# Patient Record
Sex: FEMALE | Race: WHITE | NOT HISPANIC OR LATINO | Employment: OTHER | URBAN - METROPOLITAN AREA
[De-identification: names, ages, dates, MRNs, and addresses within clinical notes are randomized per-mention and may not be internally consistent; named-entity substitution may affect disease eponyms.]

---

## 2021-04-19 VITALS — BODY MASS INDEX: 27.28 KG/M2 | HEIGHT: 68 IN | WEIGHT: 180 LBS

## 2021-04-19 RX ORDER — MULTIVITAMIN
1 TABLET ORAL DAILY
COMMUNITY

## 2021-04-19 RX ORDER — ALENDRONATE SODIUM 70 MG/1
70 TABLET ORAL
COMMUNITY
End: 2021-05-03

## 2021-04-19 NOTE — TELEPHONE ENCOUNTER
Why does your doctor want you to have this procedure? Hx  Colon cancer    Do you have kidney disease?  no  If yes, are you on dialysis :     Have you had diverticulitis within the past 2 months? no    Are you diabetic?  no  If yes, insulin dependent: If yes, provide diabetic instructions sheet     Do take iron supplements?  no  If yes, instruct patient to hold iron supplement for 7 days prior    Are you on a blood thinner? no   Was the blood thinner sheet complete and faxed to cardiologist no  Plavix (clopidogrel), Coumadin (warfarin), Lovenox (enoxaparin), Xarelto (rivaroxaban), Pradaxa(dabigatran), Eliquis(apixaban) Savaysa/Lixiana (edoxapan)    Do you have an automatic implantable cardiac defibrillator (AICD)/pacemaker ([facility])? no  Was AICD/pacemaker sheet completed and faxed to cardiologist? no    Are you on home oxygen? no  If yes, continuous or nocturnal:     Have you been treated for MRSA, VRE or any communicable diseases? no    Heart attack, stroke, or stent within 3 months? no  Schedule at Hospital if within 3-6 months   Use nitroglycerin for chest pain in the last 6 months? no    History of organ  transplant?  no   If yes, notify Endo      History of neck/throat/tongue surgery or cancer? no  IF yes, notify Endo      Any problems with anesthesia in the past? no     Was stool C diff ordered?  no Stool specimen needs to be completed prior to procedure    Do have any facial or body piercings?no     Do you have a latex allergy? no     Do have an allergy to metals? (Bravo study only) no     If pediatric patient, was consent faxed to pediatrician no     Patient rights reviewed yes    Miralax prep given and instructions emailed to patient

## 2021-05-03 ENCOUNTER — HOSPITAL ENCOUNTER (OUTPATIENT)
Dept: GASTROENTEROLOGY | Facility: AMBULATORY SURGERY CENTER | Age: 81
Discharge: HOME | End: 2021-05-03
Payer: MEDICARE

## 2021-05-03 ENCOUNTER — ANESTHESIA (OUTPATIENT)
Dept: GASTROENTEROLOGY | Facility: AMBULATORY SURGERY CENTER | Age: 81
End: 2021-05-03

## 2021-05-03 ENCOUNTER — ANESTHESIA EVENT (OUTPATIENT)
Dept: GASTROENTEROLOGY | Facility: AMBULATORY SURGERY CENTER | Age: 81
End: 2021-05-03

## 2021-05-03 VITALS
OXYGEN SATURATION: 100 % | BODY MASS INDEX: 28.13 KG/M2 | HEART RATE: 66 BPM | WEIGHT: 185 LBS | DIASTOLIC BLOOD PRESSURE: 66 MMHG | RESPIRATION RATE: 18 BRPM | SYSTOLIC BLOOD PRESSURE: 132 MMHG | TEMPERATURE: 97.7 F

## 2021-05-03 DIAGNOSIS — Z80.0 FAMILY HISTORY OF COLON CANCER: ICD-10-CM

## 2021-05-03 DIAGNOSIS — Z86.010 HISTORY OF COLON POLYPS: ICD-10-CM

## 2021-05-03 DIAGNOSIS — Z83.71 FAMILY HISTORY OF COLONIC POLYPS: ICD-10-CM

## 2021-05-03 DIAGNOSIS — Z85.038 PERSONAL HISTORY OF COLON CANCER: ICD-10-CM

## 2021-05-03 PROCEDURE — G0105 COLORECTAL SCRN; HI RISK IND: HCPCS | Performed by: INTERNAL MEDICINE

## 2021-05-03 RX ORDER — SODIUM CHLORIDE 9 MG/ML
50 INJECTION, SOLUTION INTRAVENOUS CONTINUOUS
Status: DISCONTINUED | OUTPATIENT
Start: 2021-05-03 | End: 2021-05-07 | Stop reason: HOSPADM

## 2021-05-03 RX ORDER — PROPOFOL 10 MG/ML
INJECTION, EMULSION INTRAVENOUS AS NEEDED
Status: DISCONTINUED | OUTPATIENT
Start: 2021-05-03 | End: 2021-05-03

## 2021-05-03 RX ADMIN — PROPOFOL 20 MG: 10 INJECTION, EMULSION INTRAVENOUS at 14:26

## 2021-05-03 RX ADMIN — PROPOFOL 20 MG: 10 INJECTION, EMULSION INTRAVENOUS at 14:23

## 2021-05-03 RX ADMIN — PROPOFOL 20 MG: 10 INJECTION, EMULSION INTRAVENOUS at 14:24

## 2021-05-03 RX ADMIN — PROPOFOL 10 MG: 10 INJECTION, EMULSION INTRAVENOUS at 14:29

## 2021-05-03 RX ADMIN — PROPOFOL 50 MG: 10 INJECTION, EMULSION INTRAVENOUS at 14:22

## 2021-05-03 RX ADMIN — PROPOFOL 20 MG: 10 INJECTION, EMULSION INTRAVENOUS at 14:28

## 2021-05-03 RX ADMIN — PROPOFOL 20 MG: 10 INJECTION, EMULSION INTRAVENOUS at 14:27

## 2021-05-03 RX ADMIN — SODIUM CHLORIDE 50 ML/HR: 9 INJECTION, SOLUTION INTRAVENOUS at 13:51

## 2021-05-03 RX ADMIN — PROPOFOL 20 MG: 10 INJECTION, EMULSION INTRAVENOUS at 14:25

## 2021-05-03 RX ADMIN — PROPOFOL 20 MG: 10 INJECTION, EMULSION INTRAVENOUS at 14:30

## 2021-05-03 NOTE — DISCHARGE INSTRUCTIONS
Colonoscopy   WHAT YOU NEED TO KNOW:   A colonoscopy is a procedure to examine the inside of your colon (intestine) with a scope  Polyps or tissue growths may have been removed during your colonoscopy  It is normal to feel bloated and to have some abdominal discomfort  You should be passing gas  If you have hemorrhoids or you had polyps removed, you may have a small amount of bleeding  DISCHARGE INSTRUCTIONS:   Seek care immediately if:    You have sudden, severe abdominal pain   You have problems swallowing   You have a large amount of black, sticky bowel movements or blood in your bowel movements   You have sudden trouble breathing   You feel weak, lightheaded, or faint or your heart beats faster than normal for you  Contact your healthcare provider if:    You have a fever and chills   You have nausea or are vomiting   Your abdomen is bloated or feels full and hard   You have abdominal pain   You have black, sticky bowel movements or blood in your bowel movements   You have not had a bowel movement for 3 days after your procedure   You have rash or hives   You have questions or concerns about your procedure  Activity:    Do not lift, strain, or run for 24 hours after your procedure   Rest after your procedure  You have been given medicine to relax you  Do not drive or make important decisions until the day after your procedure  Return to your normal activity as directed   Relieve gas and discomfort from bloating by lying on your right side with a heating pad on your abdomen  You may need to take short walks to help the gas move out  Eat small meals until bloating is relieved  Follow up with your healthcare provider as directed: Write down your questions so you remember to ask them during your visits  If you take a blood thinner, please review the specific instructions from your endoscopist about when you should resume it   These can be found in the Recommendation and Your Medication list sections of this After Visit Summary  Hemorrhoids   WHAT YOU NEED TO KNOW:   What are hemorrhoids? Hemorrhoids are swollen blood vessels inside your rectum (internal hemorrhoids) or on your anus (external hemorrhoids)  Sometimes a hemorrhoid may prolapse  This means it extends out of your anus  What increases my risk for hemorrhoids? · Pregnancy or obesity    · Straining or sitting for a long time during bowel movements    · Liver disease    · Weak muscles around the anus caused by older age, rectal surgery, or anal intercourse    · A lack of physical activity    · Chronic diarrhea or constipation    · A low-fiber diet    What are the signs and symptoms of hemorrhoids? · Pain or itching around your anus or inside your rectum    · Swelling or bumps around your anus    · Bright red blood in your bowel movement, on the toilet paper, or in the toilet bowl    · Tissue bulging out of your anus (prolapsed hemorrhoids)    · Incontinence (poor control over urine or bowel movements)    How are hemorrhoids diagnosed? Your healthcare provider will ask about your symptoms, the foods you eat, and your bowel movements  He or she will examine your anus for external hemorrhoids  You may need the following:  · A digital rectal exam  is a test to check for hemorrhoids  Your healthcare provider will put a gloved finger inside your anus to feel for the hemorrhoids  · An anoscopy  is a test that uses a scope (small tube with a light and camera on the end) to look at your hemorrhoids  How are hemorrhoids treated? Treatment will depend on your symptoms  You may need any of the following:  · Medicines  can help decrease pain and swelling, and soften your bowel movement  The medicine may be a pill, pad, cream, or ointment  · Procedures  may be used to shrink or remove your hemorrhoid  Examples include rubber-band ligation, sclerotherapy, and photocoagulation   These procedures may be done in your healthcare provider's office  Ask your healthcare provider for more information about these procedures  · Surgery  may be needed to shrink or remove your hemorrhoids  How can I manage my symptoms? · Apply ice on your anus for 15 to 20 minutes every hour or as directed  Use an ice pack, or put crushed ice in a plastic bag  Cover it with a towel before you apply it to your anus  Ice helps prevent tissue damage and decreases swelling and pain  · Take a sitz bath  Fill a bathtub with 4 to 6 inches of warm water  You may also use a sitz bath pan that fits inside a toilet bowl  Sit in the sitz bath for 15 minutes  Do this 3 times a day, and after each bowel movement  The warm water can help decrease pain and swelling  · Keep your anal area clean  Gently wash the area with warm water daily  Soap may irritate the area  After a bowel movement, wipe with moist towelettes or wet toilet paper  Dry toilet paper can irritate the area  How can I help prevent hemorrhoids? · Do not strain to have a bowel movement  Do not sit on the toilet too long  These actions can increase pressure on the tissues in your rectum and anus  · Drink plenty of liquids  Liquids can help prevent constipation  Ask how much liquid to drink each day and which liquids are best for you  · Eat a variety of high-fiber foods  Examples include fruits, vegetables, and whole grains  Ask your healthcare provider how much fiber you need each day  You may need to take a fiber supplement  · Exercise as directed  Exercise, such as walking, may make it easier to have a bowel movement  Ask your healthcare provider to help you create an exercise plan  · Do not have anal sex  Anal sex can weaken the skin around your rectum and anus  · Avoid heavy lifting  This can cause straining and increase your risk for another hemorrhoid  When should I seek immediate care?    · You have severe pain in your rectum or around your anus  · You have severe pain in your abdomen and you are vomiting  · You have bleeding from your anus that soaks through your underwear  When should I contact my healthcare provider? · You have frequent and painful bowel movements  · Your hemorrhoid looks or feels more swollen than usual      · You do not have a bowel movement for 2 days or more  · You see or feel tissue coming through your anus  · You have questions or concerns about your condition or care  CARE AGREEMENT:   You have the right to help plan your care  Learn about your health condition and how it may be treated  Discuss treatment options with your healthcare providers to decide what care you want to receive  You always have the right to refuse treatment  The above information is an  only  It is not intended as medical advice for individual conditions or treatments  Talk to your doctor, nurse or pharmacist before following any medical regimen to see if it is safe and effective for you  © Copyright [address] Information is for End User's use only and may not be sold, redistributed or otherwise used for commercial purposes   All illustrations and images included in CareNotes® are the copyrighted property of A D A M [employer] Inc  or [address] [place] [address]

## 2021-05-03 NOTE — ANESTHESIA PREPROCEDURE EVALUATION
Procedure:  COLONOSCOPY    Relevant Problems   ANESTHESIA (within normal limits)      CARDIO   (+) Atrial fibrillation (HCC) (Resolved)      PULMONARY   (+) Sleep apnea      Other   (+) Irregular heart beat        Physical Exam    Airway    Mallampati score: II  TM Distance: >3 FB  Neck ROM: full     Dental       Cardiovascular  Rhythm: regular, Rate: normal,     Pulmonary  Decreased breath sounds,     Other Findings        Anesthesia Plan  ASA Score- 2     Anesthesia Type- IV sedation with anesthesia with ASA Monitors  Additional Monitors:   Airway Plan:           Plan Factors-Exercise tolerance (METS): >4 METS  Chart reviewed  Patient summary reviewed  Patient is not a current smoker  Induction- intravenous  Postoperative Plan-     Informed Consent- Anesthetic plan and risks discussed with patient

## 2021-05-03 NOTE — H&P
History and Physical - [facility]  Carolina Morrell 68 y o  female MRN: [id number]    HPI: Carolina Morrell is a 68y o  year old female who presents for surveillance colonoscopy for personal history of colon cancer    REVIEW OF SYSTEMS: Per the HPI, and otherwise unremarkable      Historical Information   Past Medical History:   Diagnosis Date    Atrial fibrillation ([facility] [address] [facility]     Colon cancer [facility] [address] [facility]     Irregular heart beat     Sleep apnea     mild     Past Surgical History:   Procedure Laterality Date    APPENDECTOMY      AV NODE ABLATION      CARDIAC LOOP RECORDER      COLON SURGERY      DILATION AND EVACUATION      TONSILLECTOMY      TOTAL HIP ARTHROPLASTY Bilateral      Social History   Social History     Substance and Sexual Activity   Alcohol Use Yes    Frequency: 2-4 times a month     Social History     Substance and Sexual Activity   Drug Use Never     Social History     Tobacco Use   Smoking Status Former Smoker   Smokeless Tobacco Never Used     Family History   Problem Relation Age of Onset    Breast cancer additional onset Mother     Heart disease Father     Cancer Brother     Colon cancer Brother        Meds/Allergies       Current Outpatient Medications:     Calcium Carbonate-Vitamin D (CALCIUM-D PO)    co-enzyme Q-10 30 MG capsule    metoprolol tartrate (LOPRESSOR) 25 mg tablet    Multiple Vitamin (multivitamin) tablet    VITAMIN B COMPLEX-C PO    Current Facility-Administered Medications:     sodium chloride 0 9 % infusion, 50 mL/hr, Intravenous, Continuous, 50 mL/hr at 05/03/21 1351    No Known Allergies    Objective     /66   Pulse 70   Temp 97 7 °F (36 5 °C) (Temporal)   Resp 12   Wt 83 9 kg (185 lb)   SpO2 98%   BMI 28 13 kg/m²     PHYSICAL EXAM    Gen: NAD AAOx3  CV: S1S2 RRR no m/r/g  CHEST: Clear b/l no c/r/w  ABD: soft, +BS NT/ND  EXT: no edema    ASSESSMENT/PLAN:  This is a 68y o  year old female here for colonoscopy, and she is stable and optimized for her procedure

## 2022-05-17 ENCOUNTER — TELEPHONE (OUTPATIENT)
Dept: GASTROENTEROLOGY | Facility: CLINIC | Age: 82
End: 2022-05-17

## 2022-05-17 NOTE — TELEPHONE ENCOUNTER
Pt states sev'l yrs ago she had surg for colon CA; was constipated and Sat disimpacted; since has had some rectal blood on TP off & on; has no abd pain & did not request appt  CB# [phone] today if possible

## 2022-05-18 ENCOUNTER — OFFICE VISIT (OUTPATIENT)
Dept: GASTROENTEROLOGY | Facility: CLINIC | Age: 82
End: 2022-05-18
Payer: MEDICARE

## 2022-05-18 VITALS
SYSTOLIC BLOOD PRESSURE: 124 MMHG | WEIGHT: 187 LBS | BODY MASS INDEX: 28.34 KG/M2 | HEIGHT: 68 IN | HEART RATE: 75 BPM | DIASTOLIC BLOOD PRESSURE: 82 MMHG

## 2022-05-18 DIAGNOSIS — K59.00 CONSTIPATION, UNSPECIFIED CONSTIPATION TYPE: ICD-10-CM

## 2022-05-18 DIAGNOSIS — K62.5 RECTAL BLEEDING: Primary | ICD-10-CM

## 2022-05-18 DIAGNOSIS — Z85.038 PERSONAL HISTORY OF COLON CANCER: ICD-10-CM

## 2022-05-18 PROCEDURE — 99213 OFFICE O/P EST LOW 20 MIN: CPT | Performed by: INTERNAL MEDICINE

## 2022-05-18 PROCEDURE — 1123F ACP DISCUSS/DSCN MKR DOCD: CPT | Performed by: INTERNAL MEDICINE

## 2022-05-18 NOTE — LETTER
May 18, 2022     [place] [address]  [address] [facility] [place] [address]    Patient: Mariya Abdullahi   YOB: 1940   Date of Visit: 5/18/2022       Dear Dr Evelyn Parker: Thank you for referring Lorena Gilbert to me for evaluation  Below are my notes for this consultation  If you have questions, please do not hesitate to call me  I look forward to following your patient along with you  Sincerely,        Frances Hamilton MD        CC: No Recipients  Frances Hamilton MD  5/18/2022  2:14 PM  Sign when Signing Visit  [address] [facility] - Outpatient Follow-up Note  Mariya Abdullahi 80 y o  female MRN: [id number]  Encounter: [id number]    ASSESSMENT AND PLAN:      1  Rectal bleeding  2  Constipation, unspecified constipation type  Rectal bleeding consistent with hemorrhoidal bleed after a bout of constipation  colonoscopy 1 year ago did confirm internal hemorrhoids along with a healthy-appearing colorectal anastomosis and was otherwise a normal examination, so the risk of recurrent colonic neoplasia is very small  Discussed importance of fiber and fluid in diet  Given resolution of symptoms would not subject to repeat endoscopic evaluation unless symptoms persist   · Continue with healthy diet with plenty of fruits and vegetables  · Continue to drink plenty fluids  · Encouraged starting a fiber supplement such as psyllium start with 1-2 spoons or if taking capsules at least 2-3 g daily  · Call for follow-up if bleeding returns or any other symptoms  3  Personal history of colon cancer  · Up-to-date with surveillance colonoscopies  Recommend examination every 3 years    Next colonoscopy due May 2024      Followup Appointment:  If bleeding or any other lower GI symptoms return  ______________________________________________________________________    Chief Complaint   Patient presents with    Rectal Bleeding     Constipation, rectal bleeding       HPI:  Works as   Usually drinks a lot, but not last week  Got constipated  Had rectal bleeding  Had some blood on stool, though hard to tell as there was a lot  Since then normal stool, but blood on toilet paper  Usually has several stools daily  Now with normal bowel habits and no more bleeding  Discussed colonoscopy from last year  Historical Information   Past Medical History:   Diagnosis Date    [facility] [address] [facility]     Colon cancer [facility] [address] [facility]     Irregular heart beat     Sleep apnea     mild     Past Surgical History:   Procedure Laterality Date    APPENDECTOMY      AV NODE ABLATION      CARDIAC LOOP RECORDER      COLON SURGERY      COLONOSCOPY      May 2021:  Normal/negative surveillance colonoscopy, no masses or polyps  Healthy-appearing anastomosis noted at 10 centimeters  Internal hemorrhoids   DILATION AND EVACUATION      TONSILLECTOMY      TOTAL HIP ARTHROPLASTY Bilateral      Social History     Substance and Sexual Activity   Alcohol Use Yes     Social History     Substance and Sexual Activity   Drug Use Never     Social History     Tobacco Use   Smoking Status Former Smoker   Smokeless Tobacco Never Used     Family History   Problem Relation Age of Onset    Breast cancer additional onset Mother     Heart disease Father     Cancer Brother     Colon cancer Brother          Current Outpatient Medications:     Calcium Carbonate-Vitamin D (CALCIUM-D PO)    co-enzyme Q-10 30 MG capsule    metoprolol tartrate (LOPRESSOR) 25 mg tablet    Multiple Vitamin (multivitamin) tablet    VITAMIN B COMPLEX-C PO  No Known Allergies  Reviewed medications and allergies and updated as indicated    PHYSICAL EXAM:    Blood pressure 124/82, pulse 75, height 5' 8" (1 727 m), weight 84 8 kg (187 lb)  Body mass index is 28 43 kg/m²  General Appearance: NAD, cooperative, alert  Eyes: Anicteric, PERRLA, EOMI  ENT:  Normocephalic, atraumatic, normal mucosa  Neck:  Supple, symmetrical, trachea midline  Resp:  Clear to auscultation bilaterally; no rales, rhonchi or wheezing; respirations unlabored   CV:  S1 S2, Regular rate and rhythm; no murmur, rub, or gallop  GI:  Soft, non-tender, non-distended; normal bowel sounds; no masses, no organomegaly   Rectal:  No external lesions, small hemorrhoid visible with red spot  Digital exam notable only for palpable hemorrhoids, no mass and no blood  Musculoskeletal: No cyanosis, clubbing or edema  Normal ROM  Skin:  No jaundice, rashes, or lesions   Heme/Lymph: No palpable cervical lymphadenopathy  Psych: Normal affect, good eye contact  Neuro: No gross deficits, AAOx3    Lab Results:   No results found for: WBC, HGB, HCT, MCV, PLT  No results found for: NA, K, CL, CO2, ANIONGAP, BUN, CREATININE, GLUCOSE, GLUF, CALCIUM, CORRECTEDCA, AST, ALT, ALKPHOS, PROT, BILITOT, EGFR  No results found for: IRON, TIBC, FERRITIN  No results found for: LIPASE    Radiology Results:   No results found

## 2022-05-18 NOTE — PATIENT INSTRUCTIONS
Continue with healthy diet with plenty of fruits and vegetables  Continue to drink plenty fluids  Encouraged starting a fiber supplement such as psyllium start with 1-2 spoons or if taking capsules at least 2-3 g daily  Call for follow-up if bleeding returns or any other symptoms

## 2022-05-18 NOTE — PROGRESS NOTES
[id number] - Outpatient Follow-up Note  John Kennedy 80 y o  female MRN: [id number]  Encounter: [id number]    ASSESSMENT AND PLAN:      1  Rectal bleeding  2  Constipation, unspecified constipation type  Rectal bleeding consistent with hemorrhoidal bleed after a bout of constipation  colonoscopy 1 year ago did confirm internal hemorrhoids along with a healthy-appearing colorectal anastomosis and was otherwise a normal examination, so the risk of recurrent colonic neoplasia is very small  Discussed importance of fiber and fluid in diet  Given resolution of symptoms would not subject to repeat endoscopic evaluation unless symptoms persist   · Continue with healthy diet with plenty of fruits and vegetables  · Continue to drink plenty fluids  · Encouraged starting a fiber supplement such as psyllium start with 1-2 spoons or if taking capsules at least 2-3 g daily  · Call for follow-up if bleeding returns or any other symptoms  3  Personal history of colon cancer  · Up-to-date with surveillance colonoscopies  Recommend examination every 3 years  Next colonoscopy due May 2024      Followup Appointment:  If bleeding or any other lower GI symptoms return  ______________________________________________________________________    Chief Complaint   Patient presents with    Rectal Bleeding     Constipation, rectal bleeding       HPI:  Works as   Usually drinks a lot, but not last week  Got constipated  Had rectal bleeding  Had some blood on stool, though hard to tell as there was a lot  Since then normal stool, but blood on toilet paper  Usually has several stools daily  Now with normal bowel habits and no more bleeding  Discussed colonoscopy from last year       Historical Information   Past Medical History:   Diagnosis Date    [facility] [address]     Colon cancer (HCC)     Irregular heart beat     Sleep apnea     mild     Past Surgical History:   Procedure Laterality Date    APPENDECTOMY      AV NODE ABLATION      CARDIAC LOOP RECORDER      COLON SURGERY      COLONOSCOPY      May 2021:  Normal/negative surveillance colonoscopy, no masses or polyps  Healthy-appearing anastomosis noted at 10 centimeters  Internal hemorrhoids   DILATION AND EVACUATION      TONSILLECTOMY      TOTAL HIP ARTHROPLASTY Bilateral      Social History     Substance and Sexual Activity   Alcohol Use Yes     Social History     Substance and Sexual Activity   Drug Use Never     Social History     Tobacco Use   Smoking Status Former Smoker   Smokeless Tobacco Never Used     Family History   Problem Relation Age of Onset    Breast cancer additional onset Mother     Heart disease Father     Cancer Brother     Colon cancer Brother          Current Outpatient Medications:     Calcium Carbonate-Vitamin D (CALCIUM-D PO)    co-enzyme Q-10 30 MG capsule    metoprolol tartrate (LOPRESSOR) 25 mg tablet    Multiple Vitamin (multivitamin) tablet    VITAMIN B COMPLEX-C PO  No Known Allergies  Reviewed medications and allergies and updated as indicated    PHYSICAL EXAM:    Blood pressure 124/82, pulse 75, height 5' 8" (1 727 m), weight 84 8 kg (187 lb)  Body mass index is 28 43 kg/m²  General Appearance: NAD, cooperative, alert  Eyes: Anicteric, PERRLA, EOMI  ENT:  Normocephalic, atraumatic, normal mucosa  Neck:  Supple, symmetrical, trachea midline  Resp:  Clear to auscultation bilaterally; no rales, rhonchi or wheezing; respirations unlabored   CV:  S1 S2, Regular rate and rhythm; no murmur, rub, or gallop  GI:  Soft, non-tender, non-distended; normal bowel sounds; no masses, no organomegaly   Rectal:  No external lesions, small hemorrhoid visible with red spot  Digital exam notable only for palpable hemorrhoids, no mass and no blood  Musculoskeletal: No cyanosis, clubbing or edema  Normal ROM    Skin:  No jaundice, rashes, or lesions   Heme/Lymph: No palpable cervical lymphadenopathy  Psych: Normal affect, good eye contact  Neuro: No gross deficits, AAOx3    Lab Results:   No results found for: WBC, HGB, HCT, MCV, PLT  No results found for: NA, K, CL, CO2, ANIONGAP, BUN, CREATININE, GLUCOSE, GLUF, CALCIUM, CORRECTEDCA, AST, ALT, ALKPHOS, PROT, BILITOT, EGFR  No results found for: IRON, TIBC, FERRITIN  No results found for: LIPASE    Radiology Results:   No results found

## 2024-04-01 ENCOUNTER — TELEPHONE (OUTPATIENT)
Age: 84
End: 2024-04-01

## 2024-04-01 NOTE — TELEPHONE ENCOUNTER
[facility] COLON     Screened by: Thelma Qiu MA    Referring Provider recall    Pre- Screening:     There is no height or weight on file to calculate BMI.  Has patient been referred for a routine screening Colonoscopy? no  Is the patient between 45-75 years old? - yes      Previous Colonoscopy yes   If yes:    Date: 5/3/2024    Facility:     Reason:       Does the patient want to see a Gastroenterologist prior to their procedure OR are they having any GI symptoms? no    Has the patient been hospitalized or had abdominal surgery in the past 6 months? no    Does the patient use supplemental oxygen? no    Does the patient take Coumadin, Lovenox, Plavix, Elliquis, Xarelto, or other blood thinning medication? no    Has the patient had a stroke, cardiac event, or stent placed in the past year? no    Colonoscopy consult scheduled    If patient is between 45yrs - 49yrs, please advise patient that we will have to confirm benefits & coverage with their insurance company for a routine screening colonoscopy.      ASC Screening    ASC Screening  BMI > than 45: No  Are you currently pregnant?: No  Do you rely on a wheelchair for mobility?: No  Do you need oxygen during the day?: No  Have you ever been informed by anesthesia that you have a difficult airway?: No  Have you been diagnosed with End Stage Renal Disease (ESRD)?: No  Are you actively on dialysis?: No  Have you been diagnosed with Pulmonary Hypertension?: No  Do you have a pacemaker or an Automatic Implantable Cardioverter Defibrillator (AICD)?: No  Have you ever had an organ transplant?: No  Have you had a stroke, heart attack, myocardial infarction (MI) within the last 6 months?: No  Have you ever been diagnosed with Aortic Stenosis?: No  Have you ever been diagnosed  with Congestive Heart Failure?: No  Have you ever been diagnosed with a heart valve disease?: No  Are you Diabetic?: No  If you are Diabetic, has your A1C been greater than 12 within the last six months?:  N/A

## 2024-04-10 ENCOUNTER — PREP FOR PROCEDURE (OUTPATIENT)
Age: 84
End: 2024-04-10

## 2024-04-10 ENCOUNTER — OFFICE VISIT (OUTPATIENT)
Age: 84
End: 2024-04-10
Payer: MEDICARE

## 2024-04-10 ENCOUNTER — TELEPHONE (OUTPATIENT)
Age: 84
End: 2024-04-10

## 2024-04-10 VITALS
WEIGHT: 189.8 LBS | SYSTOLIC BLOOD PRESSURE: 116 MMHG | HEIGHT: 68 IN | BODY MASS INDEX: 28.76 KG/M2 | DIASTOLIC BLOOD PRESSURE: 78 MMHG

## 2024-04-10 DIAGNOSIS — K59.00 CONSTIPATION, UNSPECIFIED CONSTIPATION TYPE: ICD-10-CM

## 2024-04-10 DIAGNOSIS — K62.5 RECTAL BLEEDING: ICD-10-CM

## 2024-04-10 DIAGNOSIS — Z85.038 PERSONAL HISTORY OF COLON CANCER: Primary | ICD-10-CM

## 2024-04-10 PROCEDURE — 99214 OFFICE O/P EST MOD 30 MIN: CPT | Performed by: INTERNAL MEDICINE

## 2024-04-10 RX ORDER — POLYETHYLENE GLYCOL 3350 17 G/17G
238 POWDER, FOR SOLUTION ORAL ONCE
Qty: 238 G | Refills: 0 | Status: SHIPPED | OUTPATIENT
Start: 2024-04-10 | End: 2024-04-10

## 2024-04-10 RX ORDER — RIBOFLAVIN (VITAMIN B2) 100 MG
100 TABLET ORAL AS NEEDED
COMMUNITY

## 2024-04-10 NOTE — PROGRESS NOTES
[facility] - Outpatient Follow-up Note  Michelle Michaels 83 y.o. female MRN: [id number]  Encounter: [id number]    ASSESSMENT AND PLAN:      1. Personal history of colon cancer  Patient with history of colorectal cancer.  Due for surveillance.  No new medical problems and no GI symptoms.  Discussed risk and benefits of continued colonoscopic surveillance given her healthy state and likelihood for longevity which would benefit from colorectal cancer prevention weighed against the risk of colonoscopy.  Patient would like to proceed with surveillance at this time.  Schedule colonoscopy at [facility]  - Colonoscopy; Future  - polyethylene glycol (MiraLax) 17 GM/SCOOP powder; Take 238 g by mouth once for 1 dose Take 238 g my mouth. Use as directed  Dispense: 238 g; Refill: 0    2. Rectal bleeding  Resolved.  Likely likely due to previous hemorrhoid bleeding.    3. Constipation, unspecified constipation type  Resolved with dietary management.      Followup Appointment: As needed pending result of colonoscopy  ______________________________________________________________________    Chief Complaint   Patient presents with    colon consult- failed OA age     HPI:  Feels well.  Due for colorectal cancer surveillance.  Has a history of colon cancer in the past.  Also has family history with brother who had colon cancer.  Previous symptoms of constipation well-controlled by diet.  No rectal bleeding.  Appetite and weight stable.  Has not developed any new medical issues, no cardiac pulmonary or neurologic issues.  No new medications or anticoagulant therapy.  Has past history of atrial fibrillation but has successful ablation.  Is on metoprolol for rate control.  No anticoagulation.  Discussed risks of colonoscopy which increased with age, particularly over age of 80.  Discussed benefits of continued colonoscopic surveillance given likelihood of her living more than the next 10 years given  "absence of significant medical problems and longevity in her family.    Historical Information   Past Medical History:   Diagnosis Date    Atrial fibrillation (HCC)     Cancer (HCC) 1996    colon resection    Colon cancer (HCC)     Coronary artery disease 2016    atrial fibrulation    Irregular heart beat     Sleep apnea     mild     Past Surgical History:   Procedure Laterality Date    ABDOMINAL SURGERY  6/10/96    colon resection    APPENDECTOMY      AV NODE ABLATION      CARDIAC LOOP RECORDER      COLON SURGERY      COLONOSCOPY      May 2021:  Normal/negative surveillance colonoscopy, no masses or polyps.  Healthy-appearing anastomosis noted at 10 centimeters.  Internal hemorrhoids.    DILATION AND EVACUATION      TONSILLECTOMY      TOTAL HIP ARTHROPLASTY Bilateral      Social History     Substance and Sexual Activity   Alcohol Use Yes    Comment: socially     Social History     Substance and Sexual Activity   Drug Use Never     Social History     Tobacco Use   Smoking Status Former    Current packs/day: 0.25    Average packs/day: 0.3 packs/day for 10.0 years (2.5 ttl pk-yrs)    Types: Cigarettes   Smokeless Tobacco Never     Family History   Problem Relation Age of Onset    Breast cancer additional onset Mother     Arthritis Mother     Heart disease Father     Cancer Brother     Colon cancer Brother          Current Outpatient Medications:     Ascorbic Acid (vitamin C) 100 MG tablet    Calcium Carbonate-Vitamin D (CALCIUM-D PO)    metoprolol tartrate (LOPRESSOR) 25 mg tablet    Multiple Vitamin (multivitamin) tablet    polyethylene glycol (MiraLax) 17 GM/SCOOP powder    VITAMIN B COMPLEX-C PO    co-enzyme Q-10 30 MG capsule  No Known Allergies  Reviewed medications and allergies and updated as indicated    PHYSICAL EXAM:    Blood pressure 116/78, height 5' 8\" (1.727 m), weight 86.1 kg (189 lb 12.8 oz). Body mass index is 28.86 kg/m².  General Appearance: NAD, cooperative, alert  Eyes: Anicteric, conjunctiva " "pink  ENT:  Normocephalic, atraumatic, normal mucosa.    Neck:  Supple, symmetrical, trachea midline  Resp:  Clear to auscultation bilaterally; no rales, rhonchi or wheezing; respirations unlabored   CV:  S1 S2, Regular rate and rhythm; no murmur, rub, or gallop.  GI:  Soft, non-tender, non-distended; normal bowel sounds; no masses, no organomegaly   Rectal: Deferred  Musculoskeletal: No cyanosis, clubbing or edema. Normal ROM.  Skin:  No jaundice, rashes, or lesions   Heme/Lymph: No palpable cervical lymphadenopathy  Psych: Normal affect, good eye contact  Neuro: No gross deficits, AAOx3    Lab Results:   No results found for: \"WBC\", \"HGB\", \"HCT\", \"MCV\", \"PLT\"  No results found for: \"NA\", \"K\", \"CL\", \"CO2\", \"ANIONGAP\", \"BUN\", \"CREATININE\", \"GLUCOSE\", \"GLUF\", \"CALCIUM\", \"CORRECTEDCA\", \"AST\", \"ALT\", \"ALKPHOS\", \"PROT\", \"BILITOT\", \"EGFR\"    Radiology Results:   No results found.    Answers submitted by the patient for this visit:  Abdominal Pain Questionnaire (Submitted on 4/4/2024)  Chief Complaint: Abdominal pain  Chronicity: chronic  Onset: more than 1 year ago  Onset quality: gradual  Frequency: rarely  Episode duration: 1 Hours  Progression since onset: resolved  Pain location: LLQ  Pain - numeric: 5/10  Pain quality: a sensation of fullness  Radiates to: does not radiate  anorexia: No  arthralgias: No  belching: No  constipation: No  diarrhea: No  dysuria: No  fever: No  flatus: No  frequency: No  headaches: No  hematochezia: No  hematuria: No  melena: No  myalgias: No  nausea: No  weight loss: No  vomiting: No  Aggravated by: nothing  Relieved by: bowel movements    "

## 2024-04-10 NOTE — LETTER
April 10, 2024     YASEMIN Dykes  [address]  [address]  [place] [address]    Patient: Michelle Michaels   YOB: 1940   Date of Visit: 4/10/2024       Dear Dr. Carty:    Thank you for referring Michelle Michaels to me for evaluation. Below are my notes for this consultation.    If you have questions, please do not hesitate to call me. I look forward to following your patient along with you.         Sincerely,        Cassie Tejada MD        CC: No Recipients    Cassie Tejada MD  4/10/2024  8:34 AM  Incomplete  [facility] - Outpatient Follow-up Note  Michelle Michaels 83 y.o. female MRN: [id number]  Encounter: [id number]    ASSESSMENT AND PLAN:      1. Personal history of colon cancer  Patient with history of colorectal cancer.  Due for surveillance.  No new medical problems and no GI symptoms.  Discussed risk and benefits of continued colonoscopic surveillance given her healthy state and likelihood for longevity which would benefit from colorectal cancer prevention weighed against the risk of colonoscopy.  Patient would like to proceed with surveillance at this time.  Schedule colonoscopy at [facility]  - Colonoscopy; Future  - polyethylene glycol (MiraLax) 17 GM/SCOOP powder; Take 238 g by mouth once for 1 dose Take 238 g my mouth. Use as directed  Dispense: 238 g; Refill: 0    2. Rectal bleeding  Resolved.  Likely likely due to previous hemorrhoid bleeding.    3. Constipation, unspecified constipation type  Resolved with dietary management.      Followup Appointment: As needed pending result of colonoscopy  ______________________________________________________________________    Chief Complaint   Patient presents with   • colon consult- failed OA age     HPI:  Feels well.  Due for colorectal cancer surveillance.  Has a history of colon cancer in the past.  Also has family history with brother who had colon cancer.  Previous symptoms of  constipation well-controlled by diet.  No rectal bleeding.  Appetite and weight stable.  Has not developed any new medical issues, no cardiac pulmonary or neurologic issues.  No new medications or anticoagulant therapy.  Has past history of atrial fibrillation but has successful ablation.  Is on metoprolol for rate control.  No anticoagulation.  Discussed risks of colonoscopy which increased with age, particularly over age of 82.  Discussed benefits of continued colonoscopic surveillance given likelihood of her living more than the next 10 years given absence of significant medical problems and longevity in her family.    Historical Information  Past Medical History:   Diagnosis Date   • Atrial fibrillation (HCC)    • Cancer (HCC) 1996    colon resection   • Colon cancer (HCC)    • Coronary artery disease 2016    atrial fibrulation   • Irregular heart beat    • Sleep apnea     mild     Past Surgical History:   Procedure Laterality Date   • ABDOMINAL SURGERY  6/10/96    colon resection   • APPENDECTOMY     • AV NODE ABLATION     • CARDIAC LOOP RECORDER     • COLON SURGERY     • COLONOSCOPY      May 2021:  Normal/negative surveillance colonoscopy, no masses or polyps.  Healthy-appearing anastomosis noted at 10 centimeters.  Internal hemorrhoids.   • DILATION AND EVACUATION     • TONSILLECTOMY     • TOTAL HIP ARTHROPLASTY Bilateral      Social History     Substance and Sexual Activity   Alcohol Use Yes    Comment: socially     Social History     Substance and Sexual Activity   Drug Use Never     Social History     Tobacco Use   Smoking Status Former   • Current packs/day: 0.25   • Average packs/day: 0.3 packs/day for 10.0 years (2.5 ttl pk-yrs)   • Types: Cigarettes   Smokeless Tobacco Never     Family History   Problem Relation Age of Onset   • Breast cancer additional onset Mother    • Arthritis Mother    • Heart disease Father    • Cancer Brother    • Colon cancer Brother          Current Outpatient Medications:   •  " Ascorbic Acid (vitamin C) 100 MG tablet  •  Calcium Carbonate-Vitamin D (CALCIUM-D PO)  •  metoprolol tartrate (LOPRESSOR) 25 mg tablet  •  Multiple Vitamin (multivitamin) tablet  •  polyethylene glycol (MiraLax) 17 GM/SCOOP powder  •  VITAMIN B COMPLEX-C PO  •  co-enzyme Q-10 30 MG capsule  No Known Allergies  Reviewed medications and allergies and updated as indicated    PHYSICAL EXAM:    Blood pressure 116/78, height 5' 8\" (1.727 m), weight 86.1 kg (189 lb 12.8 oz). Body mass index is 28.86 kg/m².  General Appearance: NAD, cooperative, alert  Eyes: Anicteric, conjunctiva pink  ENT:  Normocephalic, atraumatic, normal mucosa.    Neck:  Supple, symmetrical, trachea midline  Resp:  Clear to auscultation bilaterally; no rales, rhonchi or wheezing; respirations unlabored   CV:  S1 S2, Regular rate and rhythm; no murmur, rub, or gallop.  GI:  Soft, non-tender, non-distended; normal bowel sounds; no masses, no organomegaly   Rectal: Deferred  Musculoskeletal: No cyanosis, clubbing or edema. Normal ROM.  Skin:  No jaundice, rashes, or lesions   Heme/Lymph: No palpable cervical lymphadenopathy  Psych: Normal affect, good eye contact  Neuro: No gross deficits, AAOx3    Lab Results:   No results found for: \"WBC\", \"HGB\", \"HCT\", \"MCV\", \"PLT\"  No results found for: \"NA\", \"K\", \"CL\", \"CO2\", \"ANIONGAP\", \"BUN\", \"CREATININE\", \"GLUCOSE\", \"GLUF\", \"CALCIUM\", \"CORRECTEDCA\", \"AST\", \"ALT\", \"ALKPHOS\", \"PROT\", \"BILITOT\", \"EGFR\"    Radiology Results:   No results found.    Answers submitted by the patient for this visit:  Abdominal Pain Questionnaire (Submitted on 4/4/2024)  Chief Complaint: Abdominal pain  Chronicity: chronic  Onset: more than 1 year ago  Onset quality: gradual  Frequency: rarely  Episode duration: 1 Hours  Progression since onset: resolved  Pain location: LLQ  Pain - numeric: 5/10  Pain quality: a sensation of fullness  Radiates to: does not radiate  anorexia: No  arthralgias: No  belching: No  constipation: No  diarrhea: " No  dysuria: No  fever: No  flatus: No  frequency: No  headaches: No  hematochezia: No  hematuria: No  melena: No  myalgias: No  nausea: No  weight loss: No  vomiting: No  Aggravated by: nothing  Relieved by: bowel movements

## 2024-04-10 NOTE — TELEPHONE ENCOUNTER
Scheduled date of colonoscopy (as of today):6/24/24  Physician performing colonoscopy:JUAN CARLOS  Location of colonoscopy:[facility]  Bowel prep reviewed with patient:Miralax/Dulcolax  Instructions reviewed with patient by:ASA  Clearances: N

## 2024-05-22 ENCOUNTER — TELEPHONE (OUTPATIENT)
Age: 84
End: 2024-05-22

## 2024-05-22 NOTE — TELEPHONE ENCOUNTER
Resheduled date of EGD(as of today): 07/17/2024  Physician performing EGD: DR BROWN  Location of EGD:[facility]  Instructions reviewed with patient by: Previously reviewed with pt. Verified pt received all procedure directions.  Clearances: N/A

## 2024-05-22 NOTE — TELEPHONE ENCOUNTER
COMPLETED 4/1  ASC Screening    ASC Screening  BMI > than 45: No  Are you currently pregnant?: No  Do you rely on a wheelchair for mobility?: No  Do you need oxygen during the day?: No  Have you ever been informed by anesthesia that you have a difficult airway?: No  Have you been diagnosed with End Stage Renal Disease (ESRD)?: No  Are you actively on dialysis?: No  Have you been diagnosed with Pulmonary Hypertension?: No  Do you have a pacemaker or an Automatic Implantable Cardioverter Defibrillator (AICD)?: No  Have you ever had an organ transplant?: No  Have you had a stroke, heart attack, myocardial infarction (MI) within the last 6 months?: No  Have you ever been diagnosed with Aortic Stenosis?: No  Have you ever been diagnosed  with Congestive Heart Failure?: No  Have you ever been diagnosed with a heart valve disease?: No  Are you Diabetic?: No  If you are Diabetic, has your A1C been greater than 12 within the last six months?: N/A

## 2024-06-28 ENCOUNTER — TELEPHONE (OUTPATIENT)
Age: 84
End: 2024-06-28

## 2024-11-08 ENCOUNTER — ANESTHESIA EVENT (OUTPATIENT)
Dept: ANESTHESIOLOGY | Facility: AMBULATORY SURGERY CENTER | Age: 84
End: 2024-11-08

## 2024-11-08 ENCOUNTER — ANESTHESIA (OUTPATIENT)
Dept: ANESTHESIOLOGY | Facility: AMBULATORY SURGERY CENTER | Age: 84
End: 2024-11-08

## 2024-11-11 ENCOUNTER — TELEPHONE (OUTPATIENT)
Dept: GASTROENTEROLOGY | Facility: CLINIC | Age: 84
End: 2024-11-11

## 2024-11-22 ENCOUNTER — HOSPITAL ENCOUNTER (OUTPATIENT)
Dept: GASTROENTEROLOGY | Facility: AMBULATORY SURGERY CENTER | Age: 84
Discharge: HOME | End: 2024-11-22
Payer: MEDICARE

## 2024-11-22 ENCOUNTER — ANESTHESIA EVENT (OUTPATIENT)
Dept: GASTROENTEROLOGY | Facility: AMBULATORY SURGERY CENTER | Age: 84
End: 2024-11-22

## 2024-11-22 ENCOUNTER — ANESTHESIA (OUTPATIENT)
Dept: GASTROENTEROLOGY | Facility: AMBULATORY SURGERY CENTER | Age: 84
End: 2024-11-22

## 2024-11-22 VITALS
HEART RATE: 67 BPM | RESPIRATION RATE: 17 BRPM | BODY MASS INDEX: 27.74 KG/M2 | WEIGHT: 183 LBS | OXYGEN SATURATION: 97 % | HEIGHT: 68 IN | DIASTOLIC BLOOD PRESSURE: 61 MMHG | TEMPERATURE: 97.8 F | SYSTOLIC BLOOD PRESSURE: 123 MMHG

## 2024-11-22 DIAGNOSIS — Z85.038 PERSONAL HISTORY OF COLON CANCER: ICD-10-CM

## 2024-11-22 PROCEDURE — G0105 COLORECTAL SCRN; HI RISK IND: HCPCS | Performed by: INTERNAL MEDICINE

## 2024-11-22 RX ORDER — SODIUM CHLORIDE, SODIUM LACTATE, POTASSIUM CHLORIDE, CALCIUM CHLORIDE 600; 310; 30; 20 MG/100ML; MG/100ML; MG/100ML; MG/100ML
INJECTION, SOLUTION INTRAVENOUS CONTINUOUS PRN
Status: DISCONTINUED | OUTPATIENT
Start: 2024-11-22 | End: 2024-11-22

## 2024-11-22 RX ORDER — PROPOFOL 10 MG/ML
INJECTION, EMULSION INTRAVENOUS AS NEEDED
Status: DISCONTINUED | OUTPATIENT
Start: 2024-11-22 | End: 2024-11-22

## 2024-11-22 RX ORDER — SODIUM CHLORIDE, SODIUM LACTATE, POTASSIUM CHLORIDE, CALCIUM CHLORIDE 600; 310; 30; 20 MG/100ML; MG/100ML; MG/100ML; MG/100ML
75 INJECTION, SOLUTION INTRAVENOUS CONTINUOUS
Status: DISCONTINUED | OUTPATIENT
Start: 2024-11-22 | End: 2024-11-26 | Stop reason: HOSPADM

## 2024-11-22 RX ADMIN — PROPOFOL 30 MG: 10 INJECTION, EMULSION INTRAVENOUS at 15:20

## 2024-11-22 RX ADMIN — SODIUM CHLORIDE, SODIUM LACTATE, POTASSIUM CHLORIDE, CALCIUM CHLORIDE: 600; 310; 30; 20 INJECTION, SOLUTION INTRAVENOUS at 15:09

## 2024-11-22 RX ADMIN — SODIUM CHLORIDE, SODIUM LACTATE, POTASSIUM CHLORIDE, CALCIUM CHLORIDE 75 ML/HR: 600; 310; 30; 20 INJECTION, SOLUTION INTRAVENOUS at 14:44

## 2024-11-22 RX ADMIN — PROPOFOL 50 MG: 10 INJECTION, EMULSION INTRAVENOUS at 15:15

## 2024-11-22 RX ADMIN — PROPOFOL 50 MG: 10 INJECTION, EMULSION INTRAVENOUS at 15:17

## 2024-11-22 RX ADMIN — PROPOFOL 100 MG: 10 INJECTION, EMULSION INTRAVENOUS at 15:12

## 2024-11-22 NOTE — ANESTHESIA PREPROCEDURE EVALUATION
Procedure:  COLONOSCOPY    Relevant Problems   CARDIO   (+) Atrial fibrillation (HCC) (Resolved)      GI/HEPATIC   (+) Rectal bleeding      PULMONARY   (+) Sleep apnea        Physical Exam    Airway    Mallampati score: II  TM Distance: >3 FB  Neck ROM: full     Dental   No notable dental hx     Cardiovascular      Pulmonary      Other Findings  post-pubertal.      Anesthesia Plan  ASA Score- 2     Anesthesia Type- IV sedation with anesthesia with ASA Monitors.         Additional Monitors:     Airway Plan:            Plan Factors-Exercise tolerance (METS): >4 METS.    Chart reviewed.    Patient summary reviewed.    Patient is not a current smoker.              Induction- intravenous.    Postoperative Plan-         Informed Consent- Anesthetic plan and risks discussed with patient.  I personally reviewed this patient with the CRNA. Discussed and agreed on the Anesthesia Plan with the CRNA..

## 2024-11-22 NOTE — H&P
History and Physical - [facility]  Michelle Michaels 84 y.o. female MRN: [id number]    HPI: Michelle Michaels is a 84 y.o. year old female who presents for surveillance colonoscopy for personal history of colon cancer and recent change in bowel habits.    REVIEW OF SYSTEMS: Per the HPI, and otherwise unremarkable.    Historical Information   Past Medical History:   Diagnosis Date    Atrial fibrillation (HCC)     Cancer (HCC) 1996    colon resection    Colon cancer (HCC)     Coronary artery disease 2016    atrial fibrulation    Irregular heart beat     Sleep apnea     mild     Past Surgical History:   Procedure Laterality Date    ABDOMINAL SURGERY  6/10/96    colon resection    APPENDECTOMY      AV NODE ABLATION      CARDIAC LOOP RECORDER      COLON SURGERY      COLONOSCOPY      May 2021:  Normal/negative surveillance colonoscopy, no masses or polyps.  Healthy-appearing anastomosis noted at 10 centimeters.  Internal hemorrhoids.    DILATION AND EVACUATION      TONSILLECTOMY      TOTAL HIP ARTHROPLASTY Bilateral      Social History   Social History     Substance and Sexual Activity   Alcohol Use Yes    Comment: socially     Social History     Substance and Sexual Activity   Drug Use Never     Social History     Tobacco Use   Smoking Status Former    Current packs/day: 0.25    Average packs/day: 0.3 packs/day for 10.0 years (2.5 ttl pk-yrs)    Types: Cigarettes   Smokeless Tobacco Never     Family History   Problem Relation Age of Onset    Breast cancer additional onset Mother     Arthritis Mother     Heart disease Father     Cancer Brother     Colon cancer Brother        Meds/Allergies       Current Outpatient Medications:     Calcium Carbonate-Vitamin D (CALCIUM-D PO)    metoprolol tartrate (LOPRESSOR) 25 mg tablet    Multiple Vitamin (multivitamin) tablet    VITAMIN B COMPLEX-C PO    Ascorbic Acid (vitamin C) 100 MG tablet    co-enzyme Q-10 30 MG capsule    polyethylene glycol (MiraLax) 17  "GM/SCOOP powder    Current Facility-Administered Medications:     lactated ringers infusion, 75 mL/hr, Intravenous, Continuous, 75 mL/hr at 11/22/24 1444    No Known Allergies    Objective     /83   Pulse 71   Temp 97.8 °F (36.6 °C) (Temporal)   Resp 17   Ht 5' 8\" (1.727 m)   Wt 83 kg (183 lb)   SpO2 99%   BMI 27.83 kg/m²     PHYSICAL EXAM    Gen: NAD AAOx3  Head: Normocephalic, Atraumatic  CV: S1S2 RRR no m/r/g  CHEST: Clear b/l no c/r/w  ABD: soft, +BS NT/ND  EXT: no edema    ASSESSMENT/PLAN:  This is a 84 y.o. year old female here for colonoscopy, and she is stable and optimized for her procedure.        "

## 2024-11-22 NOTE — ANESTHESIA POSTPROCEDURE EVALUATION
Post-Op Assessment Note    CV Status:  Stable  Pain Score: 0    Pain management: adequate       Mental Status:  Alert and awake   Hydration Status:  Euvolemic and stable   PONV Controlled:  Controlled   Airway Patency:  Patent and adequate     Post Op Vitals Reviewed: Yes    No anethesia notable event occurred.    Staff: CRNA           Last Filed PACU Vitals:  Vitals Value Taken Time   Temp     Pulse     BP     Resp     SpO2         Modified Latonya:  Activity: 2 (11/22/2024  2:14 PM)  Respiration: 2 (11/22/2024  2:14 PM)  Circulation: 2 (11/22/2024  2:14 PM)  Consciousness: 2 (11/22/2024  2:14 PM)  Oxygen Saturation: 2 (11/22/2024  2:14 PM)  Modified Latonya Score: 10 (11/22/2024  2:14 PM)

## 2025-08-18 ENCOUNTER — APPOINTMENT (OUTPATIENT)
Age: 85
End: 2025-08-18
Payer: MEDICARE

## 2025-08-18 DIAGNOSIS — I51.7 CARDIOMEGALY: ICD-10-CM

## 2025-08-18 DIAGNOSIS — I48.0 PAROXYSMAL ATRIAL FIBRILLATION (HCC): ICD-10-CM

## 2025-08-18 LAB
ALBUMIN SERPL BCG-MCNC: 4.2 G/DL (ref 3.5–5)
ALP SERPL-CCNC: 60 U/L (ref 34–104)
ALT SERPL W P-5'-P-CCNC: 15 U/L (ref 7–52)
ANION GAP SERPL CALCULATED.3IONS-SCNC: 8 MMOL/L (ref 4–13)
AST SERPL W P-5'-P-CCNC: 17 U/L (ref 13–39)
BASOPHILS # BLD AUTO: 0.03 THOUSANDS/ÂΜL (ref 0–0.1)
BASOPHILS NFR BLD AUTO: 1 % (ref 0–1)
BILIRUB SERPL-MCNC: 1.09 MG/DL (ref 0.2–1)
BNP SERPL-MCNC: 63 PG/ML (ref 0–100)
BUN SERPL-MCNC: 26 MG/DL (ref 5–25)
CALCIUM SERPL-MCNC: 9.8 MG/DL (ref 8.4–10.2)
CHLORIDE SERPL-SCNC: 101 MMOL/L (ref 96–108)
CO2 SERPL-SCNC: 31 MMOL/L (ref 21–32)
CREAT SERPL-MCNC: 0.66 MG/DL (ref 0.6–1.3)
EOSINOPHIL # BLD AUTO: 0.18 THOUSAND/ÂΜL (ref 0–0.61)
EOSINOPHIL NFR BLD AUTO: 3 % (ref 0–6)
ERYTHROCYTE [DISTWIDTH] IN BLOOD BY AUTOMATED COUNT: 13.1 % (ref 11.6–15.1)
GFR SERPL CREATININE-BSD FRML MDRD: 81 ML/MIN/1.73SQ M
GLUCOSE P FAST SERPL-MCNC: 96 MG/DL (ref 65–99)
HCT VFR BLD AUTO: 42 % (ref 34.8–46.1)
HGB BLD-MCNC: 14 G/DL (ref 11.5–15.4)
IMM GRANULOCYTES # BLD AUTO: 0.01 THOUSAND/UL (ref 0–0.2)
IMM GRANULOCYTES NFR BLD AUTO: 0 % (ref 0–2)
LYMPHOCYTES # BLD AUTO: 1.74 THOUSANDS/ÂΜL (ref 0.6–4.47)
LYMPHOCYTES NFR BLD AUTO: 28 % (ref 14–44)
MAGNESIUM SERPL-MCNC: 2.3 MG/DL (ref 1.9–2.7)
MCH RBC QN AUTO: 30 PG (ref 26.8–34.3)
MCHC RBC AUTO-ENTMCNC: 33.3 G/DL (ref 31.4–37.4)
MCV RBC AUTO: 90 FL (ref 82–98)
MONOCYTES # BLD AUTO: 0.52 THOUSAND/ÂΜL (ref 0.17–1.22)
MONOCYTES NFR BLD AUTO: 9 % (ref 4–12)
NEUTROPHILS # BLD AUTO: 3.67 THOUSANDS/ÂΜL (ref 1.85–7.62)
NEUTS SEG NFR BLD AUTO: 59 % (ref 43–75)
NRBC BLD AUTO-RTO: 0 /100 WBCS
PLATELET # BLD AUTO: 186 THOUSANDS/UL (ref 149–390)
PMV BLD AUTO: 9.6 FL (ref 8.9–12.7)
POTASSIUM SERPL-SCNC: 4.2 MMOL/L (ref 3.5–5.3)
PROT SERPL-MCNC: 6.7 G/DL (ref 6.4–8.4)
RBC # BLD AUTO: 4.66 MILLION/UL (ref 3.81–5.12)
SODIUM SERPL-SCNC: 140 MMOL/L (ref 135–147)
WBC # BLD AUTO: 6.15 THOUSAND/UL (ref 4.31–10.16)

## 2025-08-18 PROCEDURE — 80053 COMPREHEN METABOLIC PANEL: CPT

## 2025-08-18 PROCEDURE — 83735 ASSAY OF MAGNESIUM: CPT

## 2025-08-18 PROCEDURE — 83880 ASSAY OF NATRIURETIC PEPTIDE: CPT

## 2025-08-18 PROCEDURE — 85025 COMPLETE CBC W/AUTO DIFF WBC: CPT

## 2025-08-18 PROCEDURE — 36415 COLL VENOUS BLD VENIPUNCTURE: CPT
